# Patient Record
Sex: MALE | URBAN - METROPOLITAN AREA
[De-identification: names, ages, dates, MRNs, and addresses within clinical notes are randomized per-mention and may not be internally consistent; named-entity substitution may affect disease eponyms.]

---

## 2020-06-12 ENCOUNTER — TELEPHONE (OUTPATIENT)
Dept: NEUROSURGERY | Facility: CLINIC | Age: 55
End: 2020-06-12

## 2020-06-12 NOTE — TELEPHONE ENCOUNTER
Care taker called this AM.  Writer had difficulty exactly what the caretaker and provider wanted.  Pt had brain surgery at [facility].  The PA-C that cares for the pt expressed some concerns about swelling near the incision.  Pt is non-verbal at base line, unconscious and VSS.  At the end of the AM call writer had the understanding that the provider wanted pt's care transferred to [facility]. There's no information in the pt's chart.    Writer spoke with the [facility] provider and the provider suggested that the PA-C call the provider to provider line to arrange for transfer.    Writer spoke with caretaker.  It turns out that they did not want the pt transferred to [facility].  They want an in person appt with a neurologist.  Caretaker stated that she has called numerous numbers and faxed orders and has not received a returned call.    Writer called the neurology clinic scheduling department.  The  agreed to reach out to the caretaker.